# Patient Record
Sex: FEMALE | Race: WHITE | Employment: OTHER | URBAN - METROPOLITAN AREA
[De-identification: names, ages, dates, MRNs, and addresses within clinical notes are randomized per-mention and may not be internally consistent; named-entity substitution may affect disease eponyms.]

---

## 2018-11-12 PROBLEM — E78.5 HYPERLIPIDEMIA: Status: ACTIVE | Noted: 2018-11-12

## 2018-11-12 PROBLEM — I25.9 MYOCARDIAL ISCHEMIA: Status: ACTIVE | Noted: 2018-11-12

## 2018-11-12 PROBLEM — F32.A DEPRESSION: Status: ACTIVE | Noted: 2018-11-12

## 2018-11-12 PROBLEM — G47.00 INSOMNIA: Status: ACTIVE | Noted: 2018-11-12

## 2018-11-12 RX ORDER — CARVEDILOL 3.12 MG/1
TABLET ORAL 2 TIMES DAILY WITH MEALS
COMMUNITY

## 2018-11-12 RX ORDER — ATORVASTATIN CALCIUM 80 MG/1
80 TABLET, FILM COATED ORAL DAILY
COMMUNITY

## 2018-11-12 RX ORDER — NITROGLYCERIN 0.4 MG/1
0.4 TABLET SUBLINGUAL
COMMUNITY

## 2018-11-12 RX ORDER — CLOPIDOGREL BISULFATE 75 MG/1
TABLET ORAL
COMMUNITY

## 2018-11-12 RX ORDER — LANOLIN ALCOHOL/MO/W.PET/CERES
CREAM (GRAM) TOPICAL
COMMUNITY

## 2018-11-12 RX ORDER — SERTRALINE HYDROCHLORIDE 25 MG/1
TABLET, FILM COATED ORAL DAILY
COMMUNITY
End: 2018-11-12 | Stop reason: SDUPTHER

## 2018-11-12 RX ORDER — GUAIFENESIN 100 MG/5ML
81 LIQUID (ML) ORAL DAILY
COMMUNITY

## 2018-11-12 RX ORDER — RANITIDINE 150 MG/1
150 CAPSULE ORAL 2 TIMES DAILY
COMMUNITY

## 2018-11-12 RX ORDER — ZOLPIDEM TARTRATE 5 MG/1
TABLET ORAL
COMMUNITY

## 2018-11-12 NOTE — TELEPHONE ENCOUNTER
Pt was last seen in [facility] by Randa Lennon 06/19/2018      Prescription is for 90 days and Direction was for 1 tab by mouth 2 a day.

## 2018-11-13 RX ORDER — SERTRALINE HYDROCHLORIDE 25 MG/1
25 TABLET, FILM COATED ORAL DAILY
Qty: 90 TAB | Refills: 3 | Status: SHIPPED | OUTPATIENT
Start: 2018-11-13 | End: 2019-01-14 | Stop reason: SDUPTHER

## 2019-01-14 RX ORDER — SERTRALINE HYDROCHLORIDE 25 MG/1
25 TABLET, FILM COATED ORAL 2 TIMES DAILY
Qty: 180 TAB | Refills: 1 | Status: SHIPPED | OUTPATIENT
Start: 2019-01-14

## 2019-01-14 NOTE — TELEPHONE ENCOUNTER
Pt stated she was originally on 25 mg sertraline twice a day and the medication that was sent in to the pharmacy on her last appt was for 25 mg once a day and she is out of refills.

## 2021-10-25 NOTE — PERIOP NOTES
Informed patient of COVID requirements, patient to complete COVID curbside testing at [address] [place] [address] Friday, October 29th between 0044-6644. Patient verbalized understanding that COVID test is required to proceed with procedure.

## 2021-10-28 ENCOUNTER — HOSPITAL ENCOUNTER (OUTPATIENT)
Dept: LAB | Age: 71
Discharge: HOME | End: 2021-10-28
Payer: MEDICARE

## 2021-10-28 ENCOUNTER — TRANSCRIBE ORDER (OUTPATIENT)
Dept: REGISTRATION | Age: 71
End: 2021-10-28

## 2021-10-28 DIAGNOSIS — Z01.812 PRE-OPERATIVE LABORATORY EXAMINATION: ICD-10-CM

## 2021-10-28 DIAGNOSIS — Z01.812 PRE-OPERATIVE LABORATORY EXAMINATION: Primary | ICD-10-CM

## 2021-10-28 PROCEDURE — U0005 INFEC AGEN DETEC AMPLI PROBE: HCPCS

## 2021-10-29 LAB
SARS-COV-2, XPLCVT: NOT DETECTED
SOURCE, COVRS: NORMAL

## 2021-11-02 ENCOUNTER — ANESTHESIA EVENT (OUTPATIENT)
Dept: ENDOSCOPY | Age: 71
End: 2021-11-02
Payer: MEDICARE

## 2021-11-02 ENCOUNTER — HOSPITAL ENCOUNTER (OUTPATIENT)
Age: 71
Setting detail: OUTPATIENT SURGERY
Discharge: HOME | End: 2021-11-02
Attending: SPECIALIST | Admitting: SPECIALIST
Payer: MEDICARE

## 2021-11-02 ENCOUNTER — ANESTHESIA (OUTPATIENT)
Dept: ENDOSCOPY | Age: 71
End: 2021-11-02
Payer: MEDICARE

## 2021-11-02 VITALS
WEIGHT: 174.38 LBS | OXYGEN SATURATION: 100 % | TEMPERATURE: 97.7 F | HEIGHT: 68 IN | SYSTOLIC BLOOD PRESSURE: 133 MMHG | HEART RATE: 56 BPM | RESPIRATION RATE: 15 BRPM | DIASTOLIC BLOOD PRESSURE: 62 MMHG | BODY MASS INDEX: 26.43 KG/M2

## 2021-11-02 PROCEDURE — 76060000031 HC ANESTHESIA FIRST 0.5 HR: Performed by: SPECIALIST

## 2021-11-02 PROCEDURE — 77030013992 HC SNR POLYP ENDOSC BSC -B: Performed by: SPECIALIST

## 2021-11-02 PROCEDURE — 88305 TISSUE EXAM BY PATHOLOGIST: CPT

## 2021-11-02 PROCEDURE — 77030021593 HC FCPS BIOP ENDOSC BSC -A: Performed by: SPECIALIST

## 2021-11-02 PROCEDURE — 74011000250 HC RX REV CODE- 250: Performed by: NURSE ANESTHETIST, CERTIFIED REGISTERED

## 2021-11-02 PROCEDURE — 74011250636 HC RX REV CODE- 250/636: Performed by: NURSE ANESTHETIST, CERTIFIED REGISTERED

## 2021-11-02 PROCEDURE — 2709999900 HC NON-CHARGEABLE SUPPLY: Performed by: SPECIALIST

## 2021-11-02 PROCEDURE — 76040000019: Performed by: SPECIALIST

## 2021-11-02 RX ORDER — FLUMAZENIL 0.1 MG/ML
0.2 INJECTION INTRAVENOUS
Status: DISCONTINUED | OUTPATIENT
Start: 2021-11-02 | End: 2021-11-02 | Stop reason: HOSPADM

## 2021-11-02 RX ORDER — LIDOCAINE HYDROCHLORIDE 20 MG/ML
INJECTION, SOLUTION EPIDURAL; INFILTRATION; INTRACAUDAL; PERINEURAL AS NEEDED
Status: DISCONTINUED | OUTPATIENT
Start: 2021-11-02 | End: 2021-11-02 | Stop reason: HOSPADM

## 2021-11-02 RX ORDER — FENTANYL CITRATE 50 UG/ML
25 INJECTION, SOLUTION INTRAMUSCULAR; INTRAVENOUS AS NEEDED
Status: DISCONTINUED | OUTPATIENT
Start: 2021-11-02 | End: 2021-11-02 | Stop reason: HOSPADM

## 2021-11-02 RX ORDER — NALOXONE HYDROCHLORIDE 0.4 MG/ML
0.4 INJECTION, SOLUTION INTRAMUSCULAR; INTRAVENOUS; SUBCUTANEOUS
Status: DISCONTINUED | OUTPATIENT
Start: 2021-11-02 | End: 2021-11-02 | Stop reason: HOSPADM

## 2021-11-02 RX ORDER — SODIUM CHLORIDE 9 MG/ML
50 INJECTION, SOLUTION INTRAVENOUS CONTINUOUS
Status: DISCONTINUED | OUTPATIENT
Start: 2021-11-02 | End: 2021-11-02 | Stop reason: HOSPADM

## 2021-11-02 RX ORDER — PROPOFOL 10 MG/ML
INJECTION, EMULSION INTRAVENOUS AS NEEDED
Status: DISCONTINUED | OUTPATIENT
Start: 2021-11-02 | End: 2021-11-02 | Stop reason: HOSPADM

## 2021-11-02 RX ORDER — FAMOTIDINE 20 MG/1
20 TABLET, FILM COATED ORAL
COMMUNITY

## 2021-11-02 RX ORDER — MIDAZOLAM HYDROCHLORIDE 1 MG/ML
.25-5 INJECTION, SOLUTION INTRAMUSCULAR; INTRAVENOUS AS NEEDED
Status: DISCONTINUED | OUTPATIENT
Start: 2021-11-02 | End: 2021-11-02 | Stop reason: HOSPADM

## 2021-11-02 RX ORDER — DEXTROMETHORPHAN/PSEUDOEPHED 2.5-7.5/.8
1.2 DROPS ORAL
Status: DISCONTINUED | OUTPATIENT
Start: 2021-11-02 | End: 2021-11-02 | Stop reason: HOSPADM

## 2021-11-02 RX ORDER — SODIUM CHLORIDE 9 MG/ML
INJECTION, SOLUTION INTRAVENOUS
Status: DISCONTINUED | OUTPATIENT
Start: 2021-11-02 | End: 2021-11-02 | Stop reason: HOSPADM

## 2021-11-02 RX ORDER — PROPOFOL 10 MG/ML
INJECTION, EMULSION INTRAVENOUS
Status: DISCONTINUED | OUTPATIENT
Start: 2021-11-02 | End: 2021-11-02 | Stop reason: HOSPADM

## 2021-11-02 RX ADMIN — PROPOFOL 30 MG: 10 INJECTION, EMULSION INTRAVENOUS at 11:18

## 2021-11-02 RX ADMIN — SODIUM CHLORIDE: 9 INJECTION, SOLUTION INTRAVENOUS at 11:10

## 2021-11-02 RX ADMIN — PROPOFOL 30 MG: 10 INJECTION, EMULSION INTRAVENOUS at 11:20

## 2021-11-02 RX ADMIN — PROPOFOL 20 MG: 10 INJECTION, EMULSION INTRAVENOUS at 11:14

## 2021-11-02 RX ADMIN — SODIUM CHLORIDE: 9 INJECTION, SOLUTION INTRAVENOUS at 10:32

## 2021-11-02 RX ADMIN — LIDOCAINE HYDROCHLORIDE 20 MG: 20 INJECTION, SOLUTION INTRAVENOUS at 11:14

## 2021-11-02 RX ADMIN — LIDOCAINE HYDROCHLORIDE 80 MG: 20 INJECTION, SOLUTION INTRAVENOUS at 11:12

## 2021-11-02 RX ADMIN — PHENYLEPHRINE HYDROCHLORIDE 80 MCG: 10 INJECTION INTRAVENOUS at 11:30

## 2021-11-02 RX ADMIN — PROPOFOL 80 MG: 10 INJECTION, EMULSION INTRAVENOUS at 11:12

## 2021-11-02 RX ADMIN — PROPOFOL INJECTABLE EMULSION 120 MCG/KG/MIN: 10 INJECTION, EMULSION INTRAVENOUS at 11:12

## 2021-11-02 NOTE — PROGRESS NOTES
[address]  1950  [id number]    Situation:  Verbal report received from: Abby Isbell RN   Procedure: Procedure(s):  COLONOSCOPY  UPPER ENDOSCOPY (EGD)  ESOPHAGOGASTRODUODENAL (EGD) BIOPSY  ENDOSCOPIC POLYPECTOMY    Background:    Preoperative diagnosis: POSITIVE FIT; GERD  Postoperative diagnosis: 1.- Gastritis  2.- Gastric Ulcer  3.- Colonic Polyps  4.- Diverticulosis    :  Dr. Eloy Garcia   Assistant(s): Endoscopy Technician-1: Sylvie Chaparro  Endoscopy RN-1: Xuan Olivera RN    Specimens:   ID Type Source Tests Collected by Time Destination   1 : Antrum Biopsies Preservative   Khalif Mccauley MD 11/2/2021 1119 Pathology   2 : Transverse Colon Polyps x 2 Preservative   Khalif Mccauley MD 11/2/2021 1123 Pathology     H. Pylori  no    Assessment:  Intra-procedure medications       Anesthesia gave intra-procedure sedation and medications, see anesthesia flow sheet yes    Intravenous fluids: NS@ KVO     Vital signs stable   yes    Abdominal assessment: round and soft   yes    Recommendation:  Discharge patient per MD order  Yes .   Return to floor  Outpatient   Family or Friend   Family   Permission to share finding with family or friend yes

## 2021-11-02 NOTE — PROGRESS NOTES
Endoscopy discharge instructions have been reviewed and given to patient. The patient verbalized understanding and acceptance of instructions. Dr. Mayte Harris  discussed with patient procedure findings and next steps.

## 2021-11-02 NOTE — INTERVAL H&P NOTE
Pre-Endoscopy H&P Update  Chief complaint/HPI/ROS:  The indication for the procedure, the patient's history and the patient's current medications are reviewed prior to the procedure and that data is reported on the H&P to which this document is attached. Any significant complaints with regard to organ systems will be noted, and if not mentioned then a review of systems is not contributory. Past Medical History:   Diagnosis Date    Arthritis     CAD (coronary artery disease)     Heart attack in Jan 20218    Cancer [facility])     skin cancer    Chronic pain     back pain    Hypertension       Past Surgical History:   Procedure Laterality Date    [facility] CARDIAC SURG PROCEDURE UNLIST      Cardiac stents x 2    [facility] CARDIAC SURG PROCEDURE UNLIST      Bilateral balloon angioplasty      Social   Social History     Tobacco Use    Smoking status: Former Smoker    Smokeless tobacco: Never Used   Substance Use Topics    Alcohol use: Yes     Comment: VERY OCCASSIONAL      Family History   Problem Relation Age of Onset    Stroke Mother     Deep Vein Thrombosis Mother     Heart Disease Father     Cancer Sister       Allergies   Allergen Reactions    Augmentin [Amoxicillin-Pot Clavulanate] Unknown (comments)    Sulfamethizole Unknown (comments)      Prior to Admission Medications   Prescriptions Last Dose Informant Patient Reported? Taking? Cetirizine 10 mg cap 10/12/2021  Yes No   Sig: Take  by mouth. aspirin 81 mg chewable tablet 11/2/2021 at Unknown time  Yes Yes   Sig: Take 81 mg by mouth daily. atorvastatin (LIPITOR) 80 mg tablet 10/31/2021  Yes No   Sig: Take 80 mg by mouth daily. carvedilol (COREG) 3.125 mg tablet 11/2/2021 at Unknown time  Yes Yes   Sig: Take  by mouth two (2) times daily (with meals). clopidogrel (PLAVIX) 75 mg tab 10/25/2021  Yes No   Sig: Take  by mouth. famotidine (Pepcid) 20 mg tablet 11/1/2021 at Unknown time  Yes Yes   Sig: Take 20 mg by mouth once over twenty-four (24) hours.    melatonin 3 mg tablet Not Taking at Unknown time  Yes No   Sig: Take  by mouth. Patient not taking: Reported on 2021   nitroglycerin (NITROSTAT) 0.4 mg SL tablet Unknown at Unknown time  Yes No   Si.4 mg by SubLINGual route every five (5) minutes as needed for Chest Pain. Up to 3 doses. raNITIdine hcl 150 mg capsule Not Taking at Unknown time  Yes No   Sig: Take 150 mg by mouth two (2) times a day. Patient not taking: Reported on 2021   sertraline (ZOLOFT) 25 mg tablet 2021 at Unknown time  No Yes   Sig: Take 1 Tab by mouth two (2) times a day. Patient taking differently: Take 100 mg by mouth daily. zolpidem (AMBIEN) 5 mg tablet 10/31/2021  Yes No   Sig: Take  by mouth nightly as needed for Sleep. Facility-Administered Medications: None       PHYSICAL EXAM:  The patient is examined immediately prior to the procedure. Visit Vitals  /75   Pulse 69   Temp 98.2 °F (36.8 °C)   Resp 12   Ht 5' 8\" (1.727 m)   Wt 79.1 kg (174 lb 6.1 oz)   SpO2 100%   Breastfeeding No   BMI 26.51 kg/m²     Gen: Appears comfortable, no distress. Pulm: comfortable respirations with no abnormal audible breath sounds  HEART: well perfused, no abnormal audible heart sounds  GI: abdomen flat. PLAN:  Informed consent discussion held, patient afforded an opportunity to ask questions and all questions answered. After being advised of the risks, benefits, and alternatives, the patient requested that we proceed and indicated so on a written consent form. Will proceed with procedure as planned.   Flo Trevizo MD

## 2021-11-02 NOTE — ANESTHESIA POSTPROCEDURE EVALUATION
Procedure(s):  COLONOSCOPY  UPPER ENDOSCOPY (EGD)  ESOPHAGOGASTRODUODENAL (EGD) BIOPSY  ENDOSCOPIC POLYPECTOMY. MAC    Anesthesia Post Evaluation        Patient location during evaluation: PACU  Level of consciousness: awake  Pain management: adequate  Airway patency: patent  Anesthetic complications: no  Cardiovascular status: acceptable  Respiratory status: acceptable  Hydration status: acceptable  Post anesthesia nausea and vomiting:  none      INITIAL Post-op Vital signs:   Vitals Value Taken Time   /62 11/02/21 1151   Temp 36.5 °C (97.7 °F) 11/02/21 1140   Pulse 65 11/02/21 1156   Resp 15 11/02/21 1156   SpO2 100 % 11/02/21 1157   Vitals shown include unvalidated device data.

## 2021-11-02 NOTE — ANESTHESIA PREPROCEDURE EVALUATION
Relevant Problems   NEUROLOGY   (+) Depression       Anesthetic History   No history of anesthetic complications            Review of Systems / Medical History  Patient summary reviewed, nursing notes reviewed and pertinent labs reviewed    Pulmonary  Within defined limits                 Neuro/Psych   Within defined limits           Cardiovascular    Hypertension          Past MI (2018), CAD, PAD and cardiac stents      Comments: Angioplasty of lower extremities    Stopped plavix 10/25   GI/Hepatic/Renal  Within defined limits              Endo/Other  Within defined limits           Other Findings            Physical Exam    Airway  Mallampati: II  TM Distance: 4 - 6 cm  Neck ROM: normal range of motion   Mouth opening: Normal     Cardiovascular    Rhythm: regular  Rate: normal         Dental    Dentition: Lower dentition intact and Upper dentition intact     Pulmonary  Breath sounds clear to auscultation               Abdominal         Other Findings            Anesthetic Plan    ASA: 3  Anesthesia type: MAC          Induction: Intravenous  Anesthetic plan and risks discussed with: Patient

## 2021-11-02 NOTE — PROGRESS NOTES

## 2021-11-02 NOTE — H&P
Date: 2021 1:30 PM   Patient Name: Wilner Stein   Account #: [id number]    Gender: Female    (age): 1950 (66)       Provider:     Annamaria Orellana NP        Referring Physician:     [address]  [address], [address] [place] [address]  [phone] (phone)  [phone] (fax)        Chief Complaint: positive FIT test           History of Present Illness:   65 y/o female who presents after a positive FIT test with her PCP. She reports she does have hemorrhoids that occasionally bleed, the day she did her test she was having constipation and thinks this may have caused the test to be positive. She denies ever seeing red/black/tarry stools. Her last colonoscopy was over 10 years ago, she had polyps. She does take Plavix after MI in 2018 but her cardiologist has told her it is ok for her to stop taking it for 2 weeks prior to procedures. She has intermittent heartburn symptoms that are controlled with Pepcid. She does not have any pertinent GI family history. ? 65 y/o female who presents after a positive FIT test with her PCP. She reports she does have hemorrhoids that occasionally bleed, the day she did her test she was having constipation and thinks this may have caused the test to be positive. She denies ever seeing red/black/tarry stools. Her last colonoscopy was over 10 years ago, she had polyps. She does take Plavix after MI in 2018 but her cardiologist has told her it is ok for her to stop taking it for 2 weeks prior to procedures. She has intermittent heartburn symptoms that are controlled with Pepcid. She does not have any pertinent GI family history. ?       Past Medical History      Medical Conditions: Cardiac Stent  Hemorrhoids  High blood pressure  High cholesterol  Ischemic Heart Disease   Surgical Procedures: balloon angioplasty on both legs   Dx Studies: Abdominal U/S, 2021  Colonoscopy  CT Scan, 2018  Endoscopy   Medications: Adult Low Dose Aspirin 81 mg Take 1 tablet by mouth once a day  atorvastatin 80 mg Take 1 tablet by mouth once a day  carvedilol 3.125 mg Take 1 tablet by mouth twice a day  cholecalciferol (vitamin D3) 50 mcg (2,000 unit) Take 1 capsule by mouth once a day  clopidogrel 75 mg Take 1 tablet by mouth once a day  gabapentin 300 mg Take 1 capsule by mouth twice a day  lisinopril 2.5 mg Take 1 tablet by mouth once a day  sertraline 100 mg Take 1 tablet by mouth once a day  valacyclovir 500 mg Take 1 tablet by mouth once a day  zolpidem 5 mg Take 1 tablet by mouth once a day   Allergies: Augmentin  Sulfa (Sulfonamide Antibiotics)   Immunizations: COVID Vaccine, 2021  Influenza, seasonal, injectable, 2020  Pneumococcal conjugate PCV 13, 2020      Social History      Alcohol: Alcohol consumption: Monthly. Tobacco: Former smoker   Drugs: None   Exercise: None   Caffeine: Daily. Marital Status:          Occupation:               Family History Sister: Diagnosed with kidney/uterine cancer;       Review of Systems:   Cardiovascular: Presents suffers from chest pain, irregular heart beat, palpitations, peripheral edema. Denies syncope, Sweats. Constitutional: Presents suffers from fatigue. Denies fever, loss of appetite, weight gain, weight loss. ENMT: Presents suffers from nose bleeds. Denies sore throat, hearing loss. Endocrine: Denies excessive thirst, heat intolerance. Eyes: Denies loss of vision. Gastrointestinal: Presents suffers from constipation, Bloating/gas, rectal pain. Denies abdominal pain, abdominal swelling, change in bowel habits, diarrhea, heartburn, jaundice, nausea, rectal bleeding, stomach cramps, vomiting, dysphagia, Stool incontinence, hematemesis. Genitourinary: Denies dark urine, dysuria, frequent urination, hematuria, incontinence. Hematologic/Lymphatic: Presents suffers from easy bruising. Denies prolonged bleeding. Integumentary: Denies itching, rashes, sun sensitivity.    Musculoskeletal: Presents suffers from arthritis, back pain, joint pain, stiffness. Denies gout, muscle weakness. Neurological: Denies dizziness, fainting, frequent headaches, memory loss. Psychiatric: Presents suffers from anxiety, difficulty sleeping. Denies depression, hallucinations, nervousness, panic attacks, paranoia. Respiratory: Presents suffers from dyspnea. Denies cough, wheezing. Vital Signs:   BP  (mmHg)  Pulse  (ppm) Rhythm Weight (lbs/oz) Height (ft/in) BMI Temp   113/74 64 Regular 175 / 0 5 / 7 27.41 98.6 (F)      Physical Exam:   Constitutional:      Appearance: No distress, appears comfortable. Communication: Understands/receives spoken information. Head/face: Inspection: Normacephalic, atraumatic. Facial strength: appears normal.   Eyes:      Conjunctivae/lids: Normal.   Pupils/irises: Pupils equal, round and normal.   Respiratory:      Effort: Normal respiratory effort, comfortable, speaks in complete sentences. Auscultation: normal breath sounds, no rubs, wheezes or rhonchi. Cardiovascular: Auscultation: normal, S1 and S2, no gallops , no rubs or murmurs . Gastrointestinal/Abdomen:      Liver/Spleen: normal, normal size, Liver size and consistency normal, spleen is non-palpable. Abdomen Exam: normal bowel sounds, non distended, non tender. Psychiatric:      Judgment/insight: Normal, normal judgement, normal insight. Orientation: oriented to time, space and person. Lab Results: No Electronic Results      Impressions: Positive FIT (fecal immunochemical test)? ? Positive FIT (fecal immunochemical test)? Assessment: 67 y/o female who presents after positive FIT test.  She denies overt bleeding. She is due for screening colonoscopy, will need to stop Plavix at least 5 days prior to procedure. She is requesting Dr. Jeffrey Scott for her colonoscopy  ? 67 y/o female who presents after positive FIT test.  She denies overt bleeding.   She is due for screening colonoscopy, will need to stop Plavix at least 5 days prior to procedure. She is requesting Dr. Cole Darling for her colonoscopy        Plan: Colonoscopy  Avoid constipation and straining, recommend fiber/stool softener/miralax etc? ? Colonoscopy? ?  ? ? Avoid constipation and straining, recommend fiber/stool softener/miralax etc?         Risk & Medical Necessity: The level of medical decision making for this visit is moderate.            Notes:              Rafa Phillips NP     Electronically signed on 2021 1:49:53 PM by Rafa Phillips NP                                 [place] [address] [facility] [id number],  1950 IPP First Visit, 2021

## 2021-11-02 NOTE — PROCEDURES
[address] [facility] CAROLYN William MD  [phone]      2021    Esophagogastroduodenoscopy & Colonoscopy Procedure Note  Keerthi Sosa  : 1950  [facility] Medical Record Number: [id number]      Indications:    GERD Occult blood in stool   Referring Physician:  Tim Reed MD  Anesthesia/Sedation: Conscious Sedation/Moderate Sedation/MAC  Endoscopist:  Dr. Saucedo Wolf  Complications:  None  Estimated Blood Loss:  None    Permit:  The indications, risks, benefits and alternatives were reviewed with the patient or their decision maker who was provided an opportunity to ask questions and all questions were answered. The specific risks of esophagogastroduodenoscopy with conscious sedation were reviewed, including but not limited to anesthetic complication, bleeding, adverse drug reaction, missed lesion, infection, IV site reactions, and intestinal perforation which would lead to the need for surgical repair. Alternatives to EGD and colonoscopy including radiographic imaging, observation without testing, or laboratory testing were reviewed as well as the limitations of those alternatives discussed. After considering the options and having all their questions answered, the patient or their decision maker provided both verbal and written consent to proceed. -----------EGD------------   Procedure in Detail:  After obtaining informed consent, positioning of the patient in the left lateral decubitus position, and conduction of a pre-procedure pause or \"time out\" the endoscope was introduced into the mouth and advanced to the duodenum. A careful inspection was made, and findings or interventions are described below. Findings:   Esophagus:normal  Stomach: Diffuse erythema of the antrum with numerous deep erosions and overlying hematin, no active hemorrhage and no visible vessels.   Cold forceps biopsies obtained for histology. Duodenum/jejunum: normal        ----------Colonoscopy-----------    Procedure in Detail:  After obtaining informed consent, positioning of the patient in the left lateral decubitus position, and conduction of a pre-procedure pause or \"time out\" the endoscope was introduced into the anus and advanced to the cecum, which was identified by the ileocecal valve and appendiceal orifice. The quality of the colonic preparation was good. A careful inspection was made as the colonoscope was withdrawn, findings and interventions are described below. Findings:   Two polyps transverse colon 6mm 5mm, taken with cold snare, retrieved, and hemostasis confirmed. There is diverticulosis in the sigmoid colon without complications such as bleeding, inflammatory change, or luminal narrowing.      ------------------------------  Specimens:    See above    Complications:   None; patient tolerated the procedure well. Impressions:  EGD:  Erosive gastropathy. Colonoscopy: Colon polyps, diverticulosis. Recommendations:     - Await pathology. -Acid suppression with a proton pump inhibitor. Thank you for entrusting me with this patient's care. Please do not hesitate to contact me with any questions or if I can be of assistance with any of your other patients' GI needs. Signed By: Josue Martinez MD                        November 2, 2021    Surgical assistant none. Implants none unless specified.

## 2021-11-02 NOTE — DISCHARGE INSTRUCTIONS
Des[address] [facility] CAROLYN Soler MD  [phone]      November 2, 2021    Dayo Diss  YOB: 1950    COLONOSCOPY DISCHARGE INSTRUCTIONS    If there is redness at IV site you should apply warm compress to area. If redness or soreness persist contact Dr. Abbe Soler' or your primary care doctor. There may be a slight amount of blood passed from the rectum. Gaseous discomfort may develop, but walking, belching will help relieve this. You may not operate a vehicle for 12 hours  You may not operate machinery or dangerous appliances for rest of today  You may not drink alcoholic beverages for 12 hours  Avoid making any critical decisions for 24 hours    DIET:  You may resume your normal diet, but some patients find that heavy or large meals may lead to indigestion or vomiting. I suggest a light meal as first food intake. MEDICATIONS:  The use of some over-the-counter pain medication may lead to bleeding after colon biopsies or polyp removal.  Tylenol (also called acetaminophen) is safe to take even if you have had colonoscopy with polyp removal.  Based on the procedure you had today you may not safely take aspirin or aspirin-like products for the next ten (10) days. Remember that Tylenol (also called acetaminophen) is safe to take after colonoscopy even if you have had biopsies or polyps removed. ACTIVITY:  You may resume your normal household activities, but it is recommended that you spend the remainder of the day resting -  avoid any strenuous activity. CALL DR. Fran Ulloa' OFFICE IF:  Increasing pain, nausea, vomiting  Abdominal distension (swelling)  Significant new or increased bleeding (oral or rectal)  Fever/Chills  Chest pain/shortness of breath                       Additional instructions:   No aspirin 10 days, hold plavix 3 more days.   We found significant acid irritation of the stomach, which we biopsied to look for the \"ulcer bacteria\". The colonoscopy revealed two polyps which we removed, and I'll contact you with the biopsy and polyp results when available. It was an honor to be your doctor today. Please let me or my office staff know if you have any feedback about today's procedure. Dahiana Erwin MD    Colonoscopy saves lives, and can prevent colon cancer. Everyone aged 48 or older needs colonoscopy.   Tell your family and friends: get the test!

## 2022-03-19 PROBLEM — I25.9 MYOCARDIAL ISCHEMIA: Status: ACTIVE | Noted: 2018-11-12

## 2022-03-19 PROBLEM — E78.5 HYPERLIPIDEMIA: Status: ACTIVE | Noted: 2018-11-12

## 2022-03-19 PROBLEM — G47.00 INSOMNIA: Status: ACTIVE | Noted: 2018-11-12

## 2022-03-19 PROBLEM — F32.A DEPRESSION: Status: ACTIVE | Noted: 2018-11-12

## 2023-05-21 RX ORDER — ASPIRIN 81 MG/1
81 TABLET, CHEWABLE ORAL DAILY
COMMUNITY

## 2023-05-21 RX ORDER — ATORVASTATIN CALCIUM 80 MG/1
80 TABLET, FILM COATED ORAL DAILY
COMMUNITY

## 2023-05-21 RX ORDER — RANITIDINE 150 MG/1
150 CAPSULE ORAL 2 TIMES DAILY
COMMUNITY

## 2023-05-21 RX ORDER — NITROGLYCERIN 0.4 MG/1
0.4 TABLET SUBLINGUAL
COMMUNITY

## 2023-05-21 RX ORDER — ZOLPIDEM TARTRATE 5 MG/1
TABLET ORAL
COMMUNITY

## 2023-05-21 RX ORDER — LANOLIN ALCOHOL/MO/W.PET/CERES
CREAM (GRAM) TOPICAL
COMMUNITY

## 2023-05-21 RX ORDER — CLOPIDOGREL BISULFATE 75 MG/1
TABLET ORAL
COMMUNITY

## 2023-05-21 RX ORDER — SERTRALINE HYDROCHLORIDE 25 MG/1
25 TABLET, FILM COATED ORAL 2 TIMES DAILY
COMMUNITY
Start: 2019-01-14

## 2023-05-21 RX ORDER — FAMOTIDINE 20 MG/1
20 TABLET, FILM COATED ORAL
COMMUNITY

## 2023-05-21 RX ORDER — CARVEDILOL 3.12 MG/1
TABLET ORAL 2 TIMES DAILY WITH MEALS
COMMUNITY

## (undated) DEVICE — SIMPLICITY FLUFF UNDERPAD 23X36, MODERATE: Brand: SIMPLICITY

## (undated) DEVICE — FORCEPS BX L240CM JAW DIA2.8MM L CAP W/ NDL MIC MESH TOOTH

## (undated) DEVICE — SOLIDIFIER MEDC 1200ML -- CONVERT TO 356117

## (undated) DEVICE — 1200 GUARD II KIT W/5MM TUBE W/O VAC TUBE: Brand: GUARDIAN

## (undated) DEVICE — SET ADMIN 16ML TBNG L100IN 2 Y INJ SITE IV PIGGY BK DISP

## (undated) DEVICE — CONTAINER SPEC 20 ML LID NEUT BUFF FORMALIN 10 % POLYPR STS

## (undated) DEVICE — BITEBLOCK ENDOSCP 60FR MAXI WHT POLYETH STURDY W/ VELC WVN

## (undated) DEVICE — CANN NASAL O2 CAPNOGRAPHY AD -- FILTERLINE

## (undated) DEVICE — KIT COLON W/ 1.1OZ LUB AND 2 END

## (undated) DEVICE — SNARE ENDOSCP M L240CM W27MM SHTH DIA2.4MM CHN 2.8MM OVL

## (undated) DEVICE — (D)SENSOR RMFG 02 PULS OXMTR -- DISC BY MFR USE ITEM 133445

## (undated) DEVICE — CATH IV AUTOGRD BC PNK 20GA 25 -- INSYTE

## (undated) DEVICE — POLYP TRAP: Brand: TRAPEASE®

## (undated) DEVICE — SYR 50ML SLIP TIP NSAF LF STRL --

## (undated) DEVICE — BAG BELONG PT PERS CLEAR HANDL

## (undated) DEVICE — 3M™ CUROS™ DISINFECTING CAP FOR NEEDLELESS CONNECTORS 270/CARTON 20 CARTONS/CASE CFF1-270: Brand: CUROS™

## (undated) DEVICE — CUFF RMFG BP INF SZ 11 DISP -- LAWSON OEM ITEM 238915

## (undated) DEVICE — ELECTRODE,RADIOTRANSLUCENT,FOAM,3PK: Brand: MEDLINE

## (undated) DEVICE — Device

## (undated) DEVICE — CANNULA CUSH AD W/ 14FT TBG

## (undated) DEVICE — BAG SPEC BIOHZRD 10 X 10 IN --